# Patient Record
Sex: FEMALE | Race: WHITE
[De-identification: names, ages, dates, MRNs, and addresses within clinical notes are randomized per-mention and may not be internally consistent; named-entity substitution may affect disease eponyms.]

---

## 2019-11-10 ENCOUNTER — HOSPITAL ENCOUNTER (INPATIENT)
Dept: HOSPITAL 74 - YASAS | Age: 24
LOS: 4 days | Discharge: LEFT BEFORE DISCHARGE | End: 2019-11-14
Attending: ALLERGY & IMMUNOLOGY | Admitting: ALLERGY & IMMUNOLOGY
Payer: COMMERCIAL

## 2019-11-10 VITALS — BODY MASS INDEX: 18.7 KG/M2

## 2019-11-10 DIAGNOSIS — F31.9: ICD-10-CM

## 2019-11-10 DIAGNOSIS — F10.230: Primary | ICD-10-CM

## 2019-11-10 DIAGNOSIS — Z91.410: ICD-10-CM

## 2019-11-10 DIAGNOSIS — F43.10: ICD-10-CM

## 2019-11-10 DIAGNOSIS — F14.20: ICD-10-CM

## 2019-11-10 DIAGNOSIS — F17.210: ICD-10-CM

## 2019-11-10 DIAGNOSIS — F39: ICD-10-CM

## 2019-11-10 DIAGNOSIS — Z56.0: ICD-10-CM

## 2019-11-10 DIAGNOSIS — F12.20: ICD-10-CM

## 2019-11-10 PROCEDURE — HZ2ZZZZ DETOXIFICATION SERVICES FOR SUBSTANCE ABUSE TREATMENT: ICD-10-PCS | Performed by: ALLERGY & IMMUNOLOGY

## 2019-11-10 RX ADMIN — Medication SCH MG: at 22:52

## 2019-11-10 RX ADMIN — Medication SCH: at 14:22

## 2019-11-10 RX ADMIN — Medication PRN MG: at 22:52

## 2019-11-10 RX ADMIN — NICOTINE SCH: 21 PATCH TRANSDERMAL at 14:22

## 2019-11-10 RX ADMIN — PRAZOSIN HYDROCHLORIDE SCH: 1 CAPSULE ORAL at 23:03

## 2019-11-10 SDOH — ECONOMIC STABILITY - INCOME SECURITY: UNEMPLOYMENT, UNSPECIFIED: Z56.0
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
## 2019-11-10 NOTE — EKG
Test Reason : 

Blood Pressure : ***/*** mmHG

Vent. Rate : 085 BPM     Atrial Rate : 085 BPM

   P-R Int : 160 ms          QRS Dur : 078 ms

    QT Int : 372 ms       P-R-T Axes : 064 065 051 degrees

   QTc Int : 442 ms

 

NORMAL SINUS RHYTHM

NORMAL ECG

NO PREVIOUS ECGS AVAILABLE

Confirmed by MAX ALVARADO, KEITH ([id number]) on 11/10/2019 10:54:13 AM

 

Referred By:  JOHNNY           Confirmed By:KEITH SANTANA MD

## 2019-11-11 LAB
ALBUMIN SERPL-MCNC: 4.1 G/DL (ref 3.4–5)
ALP SERPL-CCNC: 68 U/L (ref 45–117)
ALT SERPL-CCNC: 28 U/L (ref 13–61)
ANION GAP SERPL CALC-SCNC: 4 MMOL/L (ref 8–16)
AST SERPL-CCNC: 19 U/L (ref 15–37)
BILIRUB SERPL-MCNC: 0.3 MG/DL (ref 0.2–1)
BUN SERPL-MCNC: 7.8 MG/DL (ref 7–18)
CALCIUM SERPL-MCNC: 9.2 MG/DL (ref 8.5–10.1)
CHLORIDE SERPL-SCNC: 105 MMOL/L (ref 98–107)
CO2 SERPL-SCNC: 28 MMOL/L (ref 21–32)
CREAT SERPL-MCNC: 0.7 MG/DL (ref 0.55–1.3)
DEPRECATED RDW RBC AUTO: 14.4 % (ref 11.6–15.6)
GLUCOSE SERPL-MCNC: 91 MG/DL (ref 74–106)
HCT VFR BLD CALC: 38.9 % (ref 32.4–45.2)
HGB BLD-MCNC: 12.7 GM/DL (ref 10.7–15.3)
MCH RBC QN AUTO: 27.9 PG (ref 25.7–33.7)
MCHC RBC AUTO-ENTMCNC: 32.6 G/DL (ref 32–36)
MCV RBC: 85.5 FL (ref 80–96)
PLATELET # BLD AUTO: 243 K/MM3 (ref 134–434)
PMV BLD: 9.8 FL (ref 7.5–11.1)
POTASSIUM SERPLBLD-SCNC: 4.4 MMOL/L (ref 3.5–5.1)
PROT SERPL-MCNC: 7.7 G/DL (ref 6.4–8.2)
RBC # BLD AUTO: 4.55 M/MM3 (ref 3.6–5.2)
SODIUM SERPL-SCNC: 136 MMOL/L (ref 136–145)
WBC # BLD AUTO: 5 K/MM3 (ref 4–10)

## 2019-11-11 RX ADMIN — PRAZOSIN HYDROCHLORIDE SCH MG: 1 CAPSULE ORAL at 22:23

## 2019-11-11 RX ADMIN — NICOTINE POLACRILEX PRN MG: 2 GUM, CHEWING ORAL at 17:14

## 2019-11-11 RX ADMIN — Medication SCH APPLIC: at 22:25

## 2019-11-11 RX ADMIN — NICOTINE POLACRILEX PRN MG: 2 GUM, CHEWING ORAL at 14:41

## 2019-11-11 RX ADMIN — Medication SCH MG: at 22:22

## 2019-11-11 RX ADMIN — Medication PRN MG: at 22:25

## 2019-11-11 RX ADMIN — Medication SCH TAB: at 10:34

## 2019-11-11 RX ADMIN — NICOTINE SCH: 21 PATCH TRANSDERMAL at 10:34

## 2019-11-11 RX ADMIN — NICOTINE POLACRILEX PRN MG: 2 GUM, CHEWING ORAL at 22:42

## 2019-11-11 RX ADMIN — NICOTINE POLACRILEX PRN MG: 2 GUM, CHEWING ORAL at 10:33
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
## 2019-11-11 NOTE — PN
[facility] CIWA





- CIWA Score


Nausea/Vomitin-No Nausea/No Vomiting


Muscle Tremors: 3


Anxiety: 2


Agitation: 2


Paroxysmal Sweats: 2


Orientation: 0-Oriented


Tacttile Disturbances: 0-None


Auditory Disturbances: 0-None


Visual Disturbances: 0-None


Headache: 0-None Present


CIWA-Ar Total Score: 9





[facility] Progress Note (SOAP)


Subjective: 





dry lips


sweats


anxiety


body aches





Objective: 





19 12:43


 Vital Signs











Temperature  98.4 F   19 09:50


 


Pulse Rate  97 H  19 09:50


 


Respiratory Rate  18   19 09:50


 


Blood Pressure  109/51 L  19 09:50


 


O2 Sat by Pulse Oximetry (%)      








 Laboratory Tests











  19





  07:50 07:50 07:50


 


WBC  5.0  


 


RBC  4.55  


 


Hgb  12.7  


 


Hct  38.9  D  


 


MCV  85.5  


 


MCH  27.9  D  


 


MCHC  32.6  


 


RDW  14.4  D  


 


Plt Count  243  


 


MPV  9.8  


 


Sodium   136 


 


Potassium   4.4 


 


Chloride   105 


 


Carbon Dioxide   28 


 


Anion Gap   4 L 


 


BUN   7.8 


 


Creatinine   0.7 


 


Est GFR (CKD-EPI)AfAm   142.54 


 


Est GFR (CKD-EPI)NonAf   122.98 


 


Random Glucose   91 


 


Calcium   9.2 


 


Total Bilirubin   0.3 


 


AST   19 


 


ALT   28 


 


Alkaline Phosphatase   68 


 


Total Protein   7.7 


 


Albumin   4.1 


 


RPR Titer    Nonreactive


 


HIV 1&2 Antibody Screen   


 


HIV P24 Antigen   














  19





  07:50


 


WBC 


 


RBC 


 


Hgb 


 


Hct 


 


MCV 


 


MCH 


 


MCHC 


 


RDW 


 


Plt Count 


 


MPV 


 


Sodium 


 


Potassium 


 


Chloride 


 


Carbon Dioxide 


 


Anion Gap 


 


BUN 


 


Creatinine 


 


Est GFR (CKD-EPI)AfAm 


 


Est GFR (CKD-EPI)NonAf 


 


Random Glucose 


 


Calcium 


 


Total Bilirubin 


 


AST 


 


ALT 


 


Alkaline Phosphatase 


 


Total Protein 


 


Albumin 


 


RPR Titer 


 


HIV 1&2 Antibody Screen  Negative


 


HIV P24 Antigen  Negative








labs noted


aaox3


ambulating


no acute distress


Assessment: 





19 12:43


withdrawal sx


Plan: 





continue detox


increase fluids


vitamin A&D oint

## 2019-11-12 RX ADMIN — Medication SCH: at 11:18

## 2019-11-12 RX ADMIN — Medication PRN MG: at 22:34

## 2019-11-12 RX ADMIN — Medication SCH: at 04:12

## 2019-11-12 RX ADMIN — HYDROXYZINE PAMOATE PRN MG: 25 CAPSULE ORAL at 22:32

## 2019-11-12 RX ADMIN — HYDROXYZINE PAMOATE PRN MG: 25 CAPSULE ORAL at 14:59

## 2019-11-12 RX ADMIN — HYDROXYZINE PAMOATE PRN MG: 25 CAPSULE ORAL at 03:34

## 2019-11-12 RX ADMIN — NICOTINE POLACRILEX PRN MG: 2 GUM, CHEWING ORAL at 20:46

## 2019-11-12 RX ADMIN — NICOTINE SCH: 21 PATCH TRANSDERMAL at 10:35

## 2019-11-12 RX ADMIN — NICOTINE POLACRILEX PRN MG: 2 GUM, CHEWING ORAL at 05:35

## 2019-11-12 RX ADMIN — PRAZOSIN HYDROCHLORIDE SCH MG: 1 CAPSULE ORAL at 22:33

## 2019-11-12 RX ADMIN — Medication SCH TAB: at 10:34

## 2019-11-12 RX ADMIN — Medication SCH APPLIC: at 05:33

## 2019-11-12 RX ADMIN — Medication SCH MG: at 22:32

## 2019-11-12 RX ADMIN — NICOTINE POLACRILEX PRN MG: 2 GUM, CHEWING ORAL at 17:40

## 2019-11-12 RX ADMIN — NICOTINE POLACRILEX PRN MG: 2 GUM, CHEWING ORAL at 14:59

## 2019-11-12 RX ADMIN — Medication SCH: at 17:41

## 2019-11-12 RX ADMIN — NICOTINE POLACRILEX PRN MG: 2 GUM, CHEWING ORAL at 22:55

## 2019-11-12 NOTE — PN
[facility] CIWA





- CIWA Score


Nausea/Vomitin-No Nausea/No Vomiting


Muscle Tremors: 2


Anxiety: 2


Agitation: 2


Paroxysmal Sweats: 1-Minimal Palms Moist


Orientation: 0-Oriented


Tacttile Disturbances: 0-None


Auditory Disturbances: 0-None


Visual Disturbances: 0-None


Headache: 0-None Present


CIWA-Ar Total Score: 7





[facility] Progress Note (SOAP)


Subjective: 





anxiety


mild shales


interrupted sleep


Objective: 





19 14:21


 Vital Signs











Temperature  97.9 F   19 13:15


 


Pulse Rate  123 H  19 13:15


 


Respiratory Rate  18   19 13:15


 


Blood Pressure  125/74   19 13:15


 


O2 Sat by Pulse Oximetry (%)      








 Laboratory Tests











  11/10/19 11/11/19 11/11/19





  08:53 07:50 07:50


 


WBC   5.0 


 


RBC   4.55 


 


Hgb   12.7 


 


Hct   38.9  D 


 


MCV   85.5 


 


MCH   27.9  D 


 


MCHC   32.6 


 


RDW   14.4  D 


 


Plt Count   243 


 


MPV   9.8 


 


Sodium    136


 


Potassium    4.4


 


Chloride    105


 


Carbon Dioxide    28


 


Anion Gap    4 L


 


BUN    7.8


 


Creatinine    0.7


 


Est GFR (CKD-EPI)AfAm    142.54


 


Est GFR (CKD-EPI)NonAf    122.98


 


Random Glucose    91


 


Calcium    9.2


 


Total Bilirubin    0.3


 


AST    19


 


ALT    28


 


Alkaline Phosphatase    68


 


Total Protein    7.7


 


Albumin    4.1


 


POC Urine HCG, Qual  Negative  


 


RPR Titer   


 


HIV 1&2 Antibody Screen   


 


HIV P24 Antigen   














  19





  07:50 07:50


 


WBC  


 


RBC  


 


Hgb  


 


Hct  


 


MCV  


 


MCH  


 


MCHC  


 


RDW  


 


Plt Count  


 


MPV  


 


Sodium  


 


Potassium  


 


Chloride  


 


Carbon Dioxide  


 


Anion Gap  


 


BUN  


 


Creatinine  


 


Est GFR (CKD-EPI)AfAm  


 


Est GFR (CKD-EPI)NonAf  


 


Random Glucose  


 


Calcium  


 


Total Bilirubin  


 


AST  


 


ALT  


 


Alkaline Phosphatase  


 


Total Protein  


 


Albumin  


 


POC Urine HCG, Qual  


 


RPR Titer  Nonreactive 


 


HIV 1&2 Antibody Screen   Negative


 


HIV P24 Antigen   Negative








aaox3


ambulating


no acute distress


Assessment: 





19 14:22


withdrawal sx


Plan: 





continue detox


increase fluids

## 2019-11-13 RX ADMIN — NICOTINE SCH: 21 PATCH TRANSDERMAL at 10:11

## 2019-11-13 RX ADMIN — Medication SCH MG: at 22:12

## 2019-11-13 RX ADMIN — Medication SCH APPLIC: at 06:37

## 2019-11-13 RX ADMIN — Medication SCH: at 14:44

## 2019-11-13 RX ADMIN — Medication SCH: at 23:05

## 2019-11-13 RX ADMIN — NICOTINE POLACRILEX PRN MG: 2 GUM, CHEWING ORAL at 20:51

## 2019-11-13 RX ADMIN — Medication PRN MG: at 22:13

## 2019-11-13 RX ADMIN — HYDROXYZINE PAMOATE PRN MG: 25 CAPSULE ORAL at 04:28

## 2019-11-13 RX ADMIN — Medication SCH: at 04:44

## 2019-11-13 RX ADMIN — NICOTINE POLACRILEX PRN MG: 2 GUM, CHEWING ORAL at 10:12

## 2019-11-13 RX ADMIN — NICOTINE POLACRILEX PRN MG: 2 GUM, CHEWING ORAL at 18:19

## 2019-11-13 RX ADMIN — Medication SCH TAB: at 10:11

## 2019-11-13 RX ADMIN — PRAZOSIN HYDROCHLORIDE SCH MG: 1 CAPSULE ORAL at 22:12

## 2019-11-13 RX ADMIN — Medication SCH: at 18:16

## 2019-11-13 RX ADMIN — HYDROXYZINE PAMOATE PRN MG: 25 CAPSULE ORAL at 22:17

## 2019-11-13 RX ADMIN — NICOTINE POLACRILEX PRN MG: 2 GUM, CHEWING ORAL at 14:31

## 2019-11-13 RX ADMIN — HYDROXYZINE PAMOATE PRN MG: 25 CAPSULE ORAL at 15:11

## 2019-11-13 NOTE — PN
[facility] CIWA





- CIWA Score


Nausea/Vomitin-No Nausea/No Vomiting


Muscle Tremors: 3


Anxiety: 3


Agitation: 3


Paroxysmal Sweats: 2


Orientation: 0-Oriented


Tacttile Disturbances: 0-None


Auditory Disturbances: 0-None


Visual Disturbances: 0-None


Headache: 0-None Present


CIWA-Ar Total Score: 11





[facility] COWS





- Scale


Resting Pulse: 2= -120


Sweatin= No chills or Flushing


Restless Observation: 1= Difficult to Sit Still


Pupil Size: 0= Normal to Room Light


Bone or Joint Aches: 1= Mild Discomfort


Runny Nose/ Eye Tearin= None


GI Upset > 30mins: 0= None


Tremor Observation of Outstretched Hands: 1= Tremor Felt, Not Seen


Yawning Observation: 0= None


Anxiety or Irritability: 2=Irritable/Anxious


Goose Flesh Skin: 0=Smooth Skin


COWS Score: 7





[facility] Progress Note (SOAP)


Subjective: 





sweats


shakes


interrupted sleep


anxiety


Objective: 





19 12:36


 Vital Signs











Temperature  98.1 F   19 09:21


 


Pulse Rate  108 H  19 09:21


 


Respiratory Rate  16   19 09:21


 


Blood Pressure  122/74   19 09:21


 


O2 Sat by Pulse Oximetry (%)      








aaox3


ambulating


no acute distress


Assessment: 





19 12:37


withdrawals


Plan: 





continue detox


increase fluids

## 2019-11-14 VITALS — TEMPERATURE: 98.2 F | SYSTOLIC BLOOD PRESSURE: 129 MMHG | DIASTOLIC BLOOD PRESSURE: 84 MMHG | HEART RATE: 102 BPM

## 2019-11-14 RX ADMIN — NICOTINE POLACRILEX PRN MG: 2 GUM, CHEWING ORAL at 10:19

## 2019-11-14 RX ADMIN — NICOTINE SCH: 21 PATCH TRANSDERMAL at 10:19

## 2019-11-14 RX ADMIN — Medication SCH APPLIC: at 05:41

## 2019-11-14 RX ADMIN — Medication SCH TAB: at 10:19

## 2019-11-14 RX ADMIN — Medication SCH: at 13:02

## 2019-11-14 RX ADMIN — HYDROXYZINE PAMOATE PRN MG: 25 CAPSULE ORAL at 03:05

## 2019-11-14 RX ADMIN — HYDROXYZINE PAMOATE PRN MG: 25 CAPSULE ORAL at 13:57

## 2019-11-14 RX ADMIN — HYDROXYZINE PAMOATE PRN MG: 25 CAPSULE ORAL at 09:50

## 2019-11-14 NOTE — PN
[facility] CIWA





- CIWA Score


Nausea/Vomitin-No Nausea/No Vomiting


Muscle Tremors: 1-None Visible, but Felt


Anxiety: 1-Mildly Anxious


Agitation: 0-Normal Activity


Paroxysmal Sweats: No Perspiration


Orientation: 0-Oriented


Tacttile Disturbances: 0-None


Auditory Disturbances: 0-None


Visual Disturbances: 0-None


Headache: 0-None Present


CIWA-Ar Total Score: 2





[facility] Progress Note (SOAP)


Subjective: 





anxiety


Objective: 





19 11:49


 Vital Signs











Temperature  98.1 F   19 09:54


 


Pulse Rate  120 H  19 09:54


 


Respiratory Rate  18   19 09:54


 


Blood Pressure  127/77   19 09:54


 


O2 Sat by Pulse Oximetry (%)      








aaox3


ambulating


no acute distress


Assessment: 





19 11:49


mild withdrawals


Plan: 





continue detox


d/c in am

## 2020-11-12 ENCOUNTER — HOSPITAL ENCOUNTER (INPATIENT)
Dept: HOSPITAL 74 - YASAS | Age: 25
LOS: 3 days | Discharge: HOME | DRG: 772 | End: 2020-11-15
Attending: ALLERGY & IMMUNOLOGY | Admitting: ALLERGY & IMMUNOLOGY
Payer: COMMERCIAL

## 2020-11-12 VITALS — BODY MASS INDEX: 21.5 KG/M2

## 2020-11-12 DIAGNOSIS — F39: ICD-10-CM

## 2020-11-12 DIAGNOSIS — F19.24: ICD-10-CM

## 2020-11-12 DIAGNOSIS — F17.210: ICD-10-CM

## 2020-11-12 DIAGNOSIS — F12.20: ICD-10-CM

## 2020-11-12 DIAGNOSIS — F43.10: ICD-10-CM

## 2020-11-12 DIAGNOSIS — F16.10: ICD-10-CM

## 2020-11-12 DIAGNOSIS — Z91.410: ICD-10-CM

## 2020-11-12 DIAGNOSIS — F20.9: ICD-10-CM

## 2020-11-12 DIAGNOSIS — Z62.810: ICD-10-CM

## 2020-11-12 DIAGNOSIS — F14.20: ICD-10-CM

## 2020-11-12 DIAGNOSIS — F31.9: ICD-10-CM

## 2020-11-12 DIAGNOSIS — Z56.0: ICD-10-CM

## 2020-11-12 DIAGNOSIS — F10.20: Primary | ICD-10-CM

## 2020-11-12 DIAGNOSIS — F19.282: ICD-10-CM

## 2020-11-12 PROCEDURE — U0003 INFECTIOUS AGENT DETECTION BY NUCLEIC ACID (DNA OR RNA); SEVERE ACUTE RESPIRATORY SYNDROME CORONAVIRUS 2 (SARS-COV-2) (CORONAVIRUS DISEASE [COVID-19]), AMPLIFIED PROBE TECHNIQUE, MAKING USE OF HIGH THROUGHPUT TECHNOLOGIES AS DESCRIBED BY CMS-2020-01-R: HCPCS

## 2020-11-12 PROCEDURE — C9803 HOPD COVID-19 SPEC COLLECT: HCPCS

## 2020-11-12 PROCEDURE — HZ42ZZZ GROUP COUNSELING FOR SUBSTANCE ABUSE TREATMENT, COGNITIVE-BEHAVIORAL: ICD-10-PCS | Performed by: ALLERGY & IMMUNOLOGY

## 2020-11-12 PROCEDURE — G0009 ADMIN PNEUMOCOCCAL VACCINE: HCPCS

## 2020-11-12 RX ADMIN — Medication SCH MG: at 21:20

## 2020-11-12 RX ADMIN — HYDROXYZINE PAMOATE SCH MG: 25 CAPSULE ORAL at 17:49

## 2020-11-12 RX ADMIN — NICOTINE POLACRILEX PRN MG: 2 GUM, CHEWING ORAL at 17:50

## 2020-11-12 RX ADMIN — HYDROXYZINE PAMOATE SCH MG: 25 CAPSULE ORAL at 21:20

## 2020-11-12 SDOH — ECONOMIC STABILITY - INCOME SECURITY: UNEMPLOYMENT, UNSPECIFIED: Z56.0

## 2020-11-13 LAB
ALBUMIN SERPL-MCNC: 4.5 G/DL (ref 3.4–5)
ALP SERPL-CCNC: 71 U/L (ref 45–117)
ALT SERPL-CCNC: 15 U/L (ref 13–61)
ANION GAP SERPL CALC-SCNC: 5 MMOL/L (ref 8–16)
APPEARANCE UR: CLEAR
AST SERPL-CCNC: 13 U/L (ref 15–37)
BILIRUB SERPL-MCNC: 0.5 MG/DL (ref 0.2–1)
BILIRUB UR STRIP.AUTO-MCNC: NEGATIVE MG/DL
BUN SERPL-MCNC: 6.8 MG/DL (ref 7–18)
CALCIUM SERPL-MCNC: 9.3 MG/DL (ref 8.5–10.1)
CHLORIDE SERPL-SCNC: 105 MMOL/L (ref 98–107)
CO2 SERPL-SCNC: 26 MMOL/L (ref 21–32)
COLOR UR: YELLOW
CREAT SERPL-MCNC: 0.7 MG/DL (ref 0.55–1.3)
DEPRECATED RDW RBC AUTO: 18.6 % (ref 11.6–15.6)
GLUCOSE SERPL-MCNC: 94 MG/DL (ref 74–106)
HCT VFR BLD CALC: 38.8 % (ref 32.4–45.2)
HGB BLD-MCNC: 12.6 GM/DL (ref 10.7–15.3)
HIV 1+2 AB+HIV1 P24 AG SERPL QL IA: NEGATIVE
KETONES UR QL STRIP: NEGATIVE
LEUKOCYTE ESTERASE UR QL STRIP.AUTO: NEGATIVE
MCH RBC QN AUTO: 27.4 PG (ref 25.7–33.7)
MCHC RBC AUTO-ENTMCNC: 32.3 G/DL (ref 32–36)
MCV RBC: 84.8 FL (ref 80–96)
NITRITE UR QL STRIP: NEGATIVE
PH UR: 7 [PH] (ref 5–8)
PLATELET # BLD AUTO: 225 K/MM3 (ref 134–434)
PMV BLD: 10.1 FL (ref 7.5–11.1)
POTASSIUM SERPLBLD-SCNC: 4 MMOL/L (ref 3.5–5.1)
PROT SERPL-MCNC: 8.4 G/DL (ref 6.4–8.2)
PROT UR QL STRIP: NEGATIVE
PROT UR QL STRIP: NEGATIVE
RBC # BLD AUTO: 4.58 M/MM3 (ref 3.6–5.2)
SICKLE CELL SCREEN: NEGATIVE
SODIUM SERPL-SCNC: 136 MMOL/L (ref 136–145)
SP GR UR: 1.01 (ref 1.01–1.03)
UROBILINOGEN UR STRIP-MCNC: 0.2 MG/DL (ref 0.2–1)
WBC # BLD AUTO: 5.4 K/MM3 (ref 4–10)

## 2020-11-13 RX ADMIN — NICOTINE SCH MG: 7 PATCH TRANSDERMAL at 10:32

## 2020-11-13 RX ADMIN — HYDROXYZINE PAMOATE PRN MG: 25 CAPSULE ORAL at 14:59

## 2020-11-13 RX ADMIN — Medication SCH TAB: at 10:31

## 2020-11-13 RX ADMIN — HYDROXYZINE PAMOATE PRN MG: 25 CAPSULE ORAL at 19:15

## 2020-11-13 RX ADMIN — HYDROXYZINE PAMOATE SCH MG: 25 CAPSULE ORAL at 06:17

## 2020-11-13 RX ADMIN — Medication SCH MG: at 12:07

## 2020-11-13 RX ADMIN — Medication SCH MG: at 21:12

## 2020-11-13 RX ADMIN — Medication SCH MG: at 21:13

## 2020-11-13 RX ADMIN — NICOTINE POLACRILEX PRN MG: 2 GUM, CHEWING ORAL at 06:18

## 2020-11-13 RX ADMIN — NICOTINE POLACRILEX PRN MG: 2 GUM, CHEWING ORAL at 19:16

## 2020-11-13 RX ADMIN — HYDROXYZINE PAMOATE SCH MG: 25 CAPSULE ORAL at 10:31

## 2020-11-13 RX ADMIN — NICOTINE POLACRILEX PRN MG: 2 GUM, CHEWING ORAL at 15:00

## 2020-11-14 RX ADMIN — Medication SCH TAB: at 09:02

## 2020-11-14 RX ADMIN — NICOTINE POLACRILEX PRN MG: 2 GUM, CHEWING ORAL at 06:26

## 2020-11-14 RX ADMIN — NICOTINE POLACRILEX PRN MG: 2 GUM, CHEWING ORAL at 16:49

## 2020-11-14 RX ADMIN — HYDROXYZINE PAMOATE PRN MG: 25 CAPSULE ORAL at 01:39

## 2020-11-14 RX ADMIN — Medication SCH MG: at 09:03

## 2020-11-14 RX ADMIN — Medication SCH MG: at 21:28

## 2020-11-14 RX ADMIN — NICOTINE POLACRILEX PRN MG: 2 GUM, CHEWING ORAL at 12:24

## 2020-11-14 RX ADMIN — HYDROXYZINE PAMOATE PRN MG: 25 CAPSULE ORAL at 14:55

## 2020-11-14 RX ADMIN — HYDROXYZINE PAMOATE PRN MG: 25 CAPSULE ORAL at 10:51

## 2020-11-14 RX ADMIN — NICOTINE SCH MG: 7 PATCH TRANSDERMAL at 09:02

## 2020-11-14 RX ADMIN — HYDROXYZINE PAMOATE PRN MG: 25 CAPSULE ORAL at 19:59

## 2020-11-14 RX ADMIN — Medication SCH MG: at 21:29

## 2020-11-14 RX ADMIN — HYDROXYZINE PAMOATE PRN MG: 25 CAPSULE ORAL at 06:26

## 2020-11-14 RX ADMIN — NICOTINE POLACRILEX PRN MG: 2 GUM, CHEWING ORAL at 09:03

## 2020-11-15 VITALS — TEMPERATURE: 97.7 F | SYSTOLIC BLOOD PRESSURE: 99 MMHG | HEART RATE: 106 BPM | DIASTOLIC BLOOD PRESSURE: 62 MMHG

## 2020-11-15 RX ADMIN — Medication SCH MG: at 10:11

## 2020-11-15 RX ADMIN — Medication SCH TAB: at 10:10

## 2020-11-15 RX ADMIN — HYDROXYZINE PAMOATE PRN MG: 25 CAPSULE ORAL at 06:40

## 2020-11-15 RX ADMIN — NICOTINE POLACRILEX PRN MG: 2 GUM, CHEWING ORAL at 06:41

## 2020-11-15 RX ADMIN — HYDROXYZINE PAMOATE PRN MG: 25 CAPSULE ORAL at 01:34

## 2020-11-15 RX ADMIN — NICOTINE POLACRILEX PRN MG: 2 GUM, CHEWING ORAL at 10:12

## 2020-11-15 RX ADMIN — HYDROXYZINE PAMOATE PRN MG: 25 CAPSULE ORAL at 10:10

## 2020-11-15 RX ADMIN — NICOTINE SCH MG: 7 PATCH TRANSDERMAL at 10:11

## 2021-10-26 ENCOUNTER — HOSPITAL ENCOUNTER (INPATIENT)
Dept: HOSPITAL 74 - JERFT | Age: 26
LOS: 1 days | Discharge: LEFT BEFORE DISCHARGE | DRG: 383 | End: 2021-10-27
Attending: INTERNAL MEDICINE | Admitting: INTERNAL MEDICINE
Payer: COMMERCIAL

## 2021-10-26 VITALS — BODY MASS INDEX: 25.2 KG/M2

## 2021-10-26 DIAGNOSIS — F31.9: ICD-10-CM

## 2021-10-26 DIAGNOSIS — F19.10: ICD-10-CM

## 2021-10-26 DIAGNOSIS — F43.10: ICD-10-CM

## 2021-10-26 DIAGNOSIS — F20.9: ICD-10-CM

## 2021-10-26 DIAGNOSIS — F12.90: ICD-10-CM

## 2021-10-26 DIAGNOSIS — F17.210: ICD-10-CM

## 2021-10-26 DIAGNOSIS — Z59.00: ICD-10-CM

## 2021-10-26 DIAGNOSIS — F60.9: ICD-10-CM

## 2021-10-26 DIAGNOSIS — L03.317: Primary | ICD-10-CM

## 2021-10-26 DIAGNOSIS — D50.9: ICD-10-CM

## 2021-10-26 PROCEDURE — U0005 INFEC AGEN DETEC AMPLI PROBE: HCPCS

## 2021-10-26 PROCEDURE — C9803 HOPD COVID-19 SPEC COLLECT: HCPCS

## 2021-10-26 PROCEDURE — U0003 INFECTIOUS AGENT DETECTION BY NUCLEIC ACID (DNA OR RNA); SEVERE ACUTE RESPIRATORY SYNDROME CORONAVIRUS 2 (SARS-COV-2) (CORONAVIRUS DISEASE [COVID-19]), AMPLIFIED PROBE TECHNIQUE, MAKING USE OF HIGH THROUGHPUT TECHNOLOGIES AS DESCRIBED BY CMS-2020-01-R: HCPCS

## 2021-10-26 SDOH — ECONOMIC STABILITY - HOUSING INSECURITY: HOMELESSNESS UNSPECIFIED: Z59.00

## 2021-10-27 VITALS — DIASTOLIC BLOOD PRESSURE: 51 MMHG | SYSTOLIC BLOOD PRESSURE: 114 MMHG | TEMPERATURE: 98.7 F | HEART RATE: 97 BPM

## 2021-10-27 LAB
ALBUMIN SERPL-MCNC: 2.5 G/DL (ref 3.4–5)
ALBUMIN SERPL-MCNC: 3.2 G/DL (ref 3.4–5)
ALP SERPL-CCNC: 54 U/L (ref 45–117)
ALP SERPL-CCNC: 64 U/L (ref 45–117)
ALT SERPL-CCNC: 27 U/L (ref 13–61)
ALT SERPL-CCNC: 35 U/L (ref 13–61)
AMPHET UR-MCNC: NEGATIVE NG/ML
ANION GAP SERPL CALC-SCNC: 2 MMOL/L (ref 8–16)
ANION GAP SERPL CALC-SCNC: 5 MMOL/L (ref 8–16)
APPEARANCE UR: (no result)
AST SERPL-CCNC: 19 U/L (ref 15–37)
AST SERPL-CCNC: 28 U/L (ref 15–37)
BACTERIA # UR AUTO: 128 /UL (ref 0–1359)
BARBITURATES UR-MCNC: NEGATIVE UG/ML
BASOPHILS # BLD: 0.4 % (ref 0–2)
BASOPHILS # BLD: 0.4 % (ref 0–2)
BASOPHILS # BLD: 0.5 % (ref 0–2)
BENZODIAZ UR SCN-MCNC: NEGATIVE NG/ML
BILIRUB SERPL-MCNC: 0.1 MG/DL (ref 0.2–1)
BILIRUB SERPL-MCNC: 0.2 MG/DL (ref 0.2–1)
BILIRUB UR STRIP.AUTO-MCNC: NEGATIVE MG/DL
BUN SERPL-MCNC: 5.8 MG/DL (ref 7–18)
BUN SERPL-MCNC: 6.6 MG/DL (ref 7–18)
CALCIUM SERPL-MCNC: 8 MG/DL (ref 8.5–10.1)
CALCIUM SERPL-MCNC: 8.2 MG/DL (ref 8.5–10.1)
CASTS URNS QL MICRO: 1 /UL (ref 0–3.1)
CHLORIDE SERPL-SCNC: 109 MMOL/L (ref 98–107)
CHLORIDE SERPL-SCNC: 114 MMOL/L (ref 98–107)
CO2 SERPL-SCNC: 24 MMOL/L (ref 21–32)
CO2 SERPL-SCNC: 28 MMOL/L (ref 21–32)
COCAINE UR-MCNC: NEGATIVE NG/ML
COLOR UR: YELLOW
CREAT SERPL-MCNC: 0.5 MG/DL (ref 0.55–1.3)
CREAT SERPL-MCNC: 0.6 MG/DL (ref 0.55–1.3)
DEPRECATED RDW RBC AUTO: 16.7 % (ref 11.6–15.6)
DEPRECATED RDW RBC AUTO: 16.7 % (ref 11.6–15.6)
DEPRECATED RDW RBC AUTO: 16.8 % (ref 11.6–15.6)
EOSINOPHIL # BLD: 0.7 % (ref 0–4.5)
EOSINOPHIL # BLD: 1.4 % (ref 0–4.5)
EOSINOPHIL # BLD: 1.5 % (ref 0–4.5)
EPITH CASTS URNS QL MICRO: 8 /UL (ref 0–25.1)
GLUCOSE SERPL-MCNC: 110 MG/DL (ref 74–106)
GLUCOSE SERPL-MCNC: 93 MG/DL (ref 74–106)
HCT VFR BLD CALC: 23.7 % (ref 32.4–45.2)
HCT VFR BLD CALC: 26.4 % (ref 32.4–45.2)
HCT VFR BLD CALC: 27.1 % (ref 32.4–45.2)
HGB BLD-MCNC: 7.8 GM/DL (ref 10.7–15.3)
HGB BLD-MCNC: 8.8 GM/DL (ref 10.7–15.3)
HGB BLD-MCNC: 8.9 GM/DL (ref 10.7–15.3)
HIV 1+2 AB+HIV1 P24 AG SERPL QL IA: NEGATIVE
INR BLD: 1.15 (ref 0.83–1.09)
IRON SERPL-MCNC: 11 UG/DL (ref 50–175)
KETONES UR QL STRIP: NEGATIVE
LEUKOCYTE ESTERASE UR QL STRIP.AUTO: NEGATIVE
LYMPHOCYTES # BLD: 13.8 % (ref 8–40)
LYMPHOCYTES # BLD: 17.8 % (ref 8–40)
LYMPHOCYTES # BLD: 22.7 % (ref 8–40)
MAGNESIUM SERPL-MCNC: 2.3 MG/DL (ref 1.8–2.4)
MCH RBC QN AUTO: 26.3 PG (ref 25.7–33.7)
MCH RBC QN AUTO: 26.4 PG (ref 25.7–33.7)
MCH RBC QN AUTO: 26.5 PG (ref 25.7–33.7)
MCHC RBC AUTO-ENTMCNC: 32.7 G/DL (ref 32–36)
MCHC RBC AUTO-ENTMCNC: 33 G/DL (ref 32–36)
MCHC RBC AUTO-ENTMCNC: 33.2 G/DL (ref 32–36)
MCV RBC: 79.8 FL (ref 80–96)
MCV RBC: 79.9 FL (ref 80–96)
MCV RBC: 80.5 FL (ref 80–96)
METHADONE UR-MCNC: NEGATIVE UG/L
MONOCYTES # BLD AUTO: 6.4 % (ref 3.8–10.2)
MONOCYTES # BLD AUTO: 6.8 % (ref 3.8–10.2)
MONOCYTES # BLD AUTO: 7.9 % (ref 3.8–10.2)
NEUTROPHILS # BLD: 67.4 % (ref 42.8–82.8)
NEUTROPHILS # BLD: 74 % (ref 42.8–82.8)
NEUTROPHILS # BLD: 78.3 % (ref 42.8–82.8)
NITRITE UR QL STRIP: NEGATIVE
OPIATES UR QL SCN: NEGATIVE
PCP UR QL SCN: NEGATIVE
PH UR: 7 [PH] (ref 5–8)
PHOSPHATE SERPL-MCNC: 3.4 MG/DL (ref 2.5–4.9)
PLATELET # BLD AUTO: 207 10^3/UL (ref 134–434)
PLATELET # BLD AUTO: 232 10^3/UL (ref 134–434)
PLATELET # BLD AUTO: 235 10^3/UL (ref 134–434)
PMV BLD: 8.6 FL (ref 7.5–11.1)
PMV BLD: 8.6 FL (ref 7.5–11.1)
PMV BLD: 8.7 FL (ref 7.5–11.1)
PROT SERPL-MCNC: 5.8 G/DL (ref 6.4–8.2)
PROT SERPL-MCNC: 7.2 G/DL (ref 6.4–8.2)
PROT UR QL STRIP: (no result)
PROT UR QL STRIP: NEGATIVE
PT PNL PPP: 12.9 SEC (ref 9.7–13)
RBC # BLD AUTO: 2.97 M/MM3 (ref 3.6–5.2)
RBC # BLD AUTO: 3.31 M/MM3 (ref 3.6–5.2)
RBC # BLD AUTO: 3.37 M/MM3 (ref 3.6–5.2)
RBC # BLD AUTO: 384 /UL (ref 0–23.9)
RETICS # AUTO: 0.81 % (ref 0.5–1.5)
SODIUM SERPL-SCNC: 139 MMOL/L (ref 136–145)
SODIUM SERPL-SCNC: 142 MMOL/L (ref 136–145)
SP GR UR: 1.02 (ref 1.01–1.03)
TIBC SERPL-MCNC: 329 UG/DL (ref 250–450)
UROBILINOGEN UR STRIP-MCNC: 1 MG/DL (ref 0.2–1)
WBC # BLD AUTO: 11.5 K/MM3 (ref 4–10)
WBC # BLD AUTO: 7.7 K/MM3 (ref 4–10)
WBC # BLD AUTO: 8.5 K/MM3 (ref 4–10)
WBC # UR AUTO: 10 /UL (ref 0–25.8)

## 2021-10-27 RX ADMIN — CLINDAMYCIN PHOSPHATE SCH MLS/HR: 300 INJECTION, SOLUTION INTRAVENOUS at 11:57

## 2021-10-27 RX ADMIN — CLINDAMYCIN PHOSPHATE SCH MLS/HR: 300 INJECTION, SOLUTION INTRAVENOUS at 19:01
